# Patient Record
Sex: MALE | Race: WHITE | NOT HISPANIC OR LATINO
[De-identification: names, ages, dates, MRNs, and addresses within clinical notes are randomized per-mention and may not be internally consistent; named-entity substitution may affect disease eponyms.]

---

## 2022-09-06 ENCOUNTER — APPOINTMENT (OUTPATIENT)
Dept: ORTHOPEDIC SURGERY | Facility: CLINIC | Age: 67
End: 2022-09-06

## 2022-09-06 VITALS — HEIGHT: 72 IN | WEIGHT: 165 LBS | BODY MASS INDEX: 22.35 KG/M2

## 2022-09-06 DIAGNOSIS — Z78.9 OTHER SPECIFIED HEALTH STATUS: ICD-10-CM

## 2022-09-06 PROBLEM — Z00.00 ENCOUNTER FOR PREVENTIVE HEALTH EXAMINATION: Status: ACTIVE | Noted: 2022-09-06

## 2022-09-06 PROCEDURE — 99203 OFFICE O/P NEW LOW 30 MIN: CPT

## 2022-09-06 PROCEDURE — 99213 OFFICE O/P EST LOW 20 MIN: CPT

## 2022-09-06 PROCEDURE — 73562 X-RAY EXAM OF KNEE 3: CPT | Mod: RT

## 2022-09-06 RX ORDER — MELOXICAM 15 MG/1
15 TABLET ORAL
Qty: 30 | Refills: 0 | Status: ACTIVE | COMMUNITY
Start: 2022-09-06 | End: 1900-01-01

## 2022-09-06 NOTE — DISCUSSION/SUMMARY
[de-identified] :   At this time I discussed with the patient I am concerned for a tear in his quadriceps tendon.  I placed him in a knee immobilizer.  I discussed with him he can weightbear as tolerated as long as he is wearing the brace.  He should wear the brace at all times.  I would like to get a stat MRI of his knee to evaluate the quad tendon.  I sent a script for meloxicam to the pharmacy to take as needed for pain.  I discussed him he can call after the MRI to go over the results we will also schedule him a follow-up in a week for a surgical consult in case the tendon is torn.  I did discuss with him that if there is a complete rupture of the quad tendon this will require surgery. Patient will call me if any other problems or concerns.  Patient verbalized understanding and agreed with the plan, all questions were answered in the office today.\par

## 2022-09-06 NOTE — IMAGING
[de-identified] :   On examination of his right knee he has some swelling of the knee, no erythema, no ecchymosis he, he is tender to palpation over the distal quad tendon and mildly over the quad muscle.  He is not able to straight leg raise.  He is nontender palpation on the patella.  He is nontender over the tibial plateau.  He is nontender over the medial or lateral joint line.  He can flex extend the knee but has pain with flexion.  He is nontender over the patellar tendon.  The calf is soft and nontender.\par \par X-rays taken in the office today of the right knee show some degenerative changes with joint space narrowing of the medial compartment.  There is a calcific density in the distal quad tendon which could be a calcification or an avulsion fracture.

## 2022-09-06 NOTE — HISTORY OF PRESENT ILLNESS
[de-identified] :  66-year-old male here today for evaluation of his right knee.  Patient states he slipped on a step yesterday and fell injuring his right knee.  He states he is having a lot of pain and difficulty bending the knee.  He is able to put weight on it.  He denies any pain radiating down the leg, he denies any calf pain.  He is here today for further evaluation.

## 2022-09-08 ENCOUNTER — APPOINTMENT (OUTPATIENT)
Dept: MRI IMAGING | Facility: CLINIC | Age: 67
End: 2022-09-08

## 2022-09-08 PROCEDURE — 73721 MRI JNT OF LWR EXTRE W/O DYE: CPT | Mod: RT,MH

## 2022-09-13 ENCOUNTER — APPOINTMENT (OUTPATIENT)
Dept: ORTHOPEDIC SURGERY | Facility: CLINIC | Age: 67
End: 2022-09-13

## 2022-09-13 PROCEDURE — 99204 OFFICE O/P NEW MOD 45 MIN: CPT | Mod: 57

## 2022-09-13 NOTE — HISTORY OF PRESENT ILLNESS
[de-identified] : Patient is here for follow-up evaluation.  He sustained a fall and had pain to the right knee.  He is in a knee immobilizer.  He is unable to straight leg raise\par \par NAD\par Right knee\par No skin breakdown\par Tricompartmental TTP\par Positive patella grind\par neg slr\par palpable gap insertion of quad tendon\par NVI\par Compartments soft and NT\par \par mri right knee\par IMPRESSION:\par 1. Complete tear with 1 cm retraction in the distal quadriceps tendon at its patellar insertion. No patella avulsion fracture is \par seen.\par 2. Partial tear of the deep medial patellofemoral ligament fibers. Moderate suprapatellar effusion.\par 3. Subcutaneous contusion and trace fluid throughout the knee, mostly over the lateral and anterior tissues.\par 4. 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  I reviewed the MRI in detail and explained his tear.  We spoke about operative versus non operative options.  At this point he will proceed with surgery for the right knee.  Risks benefits and alternatives were also explained.  He will proceed with a right quadriceps tendon repair.  I spoke about incisions and rehabilitation.  All questions were answered

## 2022-09-21 ENCOUNTER — RESULT REVIEW (OUTPATIENT)
Age: 67
End: 2022-09-21

## 2022-09-21 ENCOUNTER — OUTPATIENT (OUTPATIENT)
Dept: OUTPATIENT SERVICES | Facility: HOSPITAL | Age: 67
LOS: 1 days | Discharge: HOME | End: 2022-09-21

## 2022-09-21 VITALS
HEIGHT: 72 IN | DIASTOLIC BLOOD PRESSURE: 65 MMHG | RESPIRATION RATE: 15 BRPM | TEMPERATURE: 96 F | SYSTOLIC BLOOD PRESSURE: 127 MMHG | WEIGHT: 166.89 LBS | OXYGEN SATURATION: 98 % | HEART RATE: 60 BPM

## 2022-09-21 DIAGNOSIS — S76.119D: ICD-10-CM

## 2022-09-21 DIAGNOSIS — Z01.818 ENCOUNTER FOR OTHER PREPROCEDURAL EXAMINATION: ICD-10-CM

## 2022-09-21 DIAGNOSIS — S76.119A STRAIN OF UNSPECIFIED QUADRICEPS MUSCLE, FASCIA AND TENDON, INITIAL ENCOUNTER: ICD-10-CM

## 2022-09-21 DIAGNOSIS — Z98.890 OTHER SPECIFIED POSTPROCEDURAL STATES: Chronic | ICD-10-CM

## 2022-09-21 LAB
ALBUMIN SERPL ELPH-MCNC: 5 G/DL — SIGNIFICANT CHANGE UP (ref 3.5–5.2)
ALP SERPL-CCNC: 55 U/L — SIGNIFICANT CHANGE UP (ref 30–115)
ALT FLD-CCNC: 19 U/L — SIGNIFICANT CHANGE UP (ref 0–41)
ANION GAP SERPL CALC-SCNC: 12 MMOL/L — SIGNIFICANT CHANGE UP (ref 7–14)
APTT BLD: 32.6 SEC — SIGNIFICANT CHANGE UP (ref 27–39.2)
AST SERPL-CCNC: 21 U/L — SIGNIFICANT CHANGE UP (ref 0–41)
BASOPHILS # BLD AUTO: 0.03 K/UL — SIGNIFICANT CHANGE UP (ref 0–0.2)
BASOPHILS NFR BLD AUTO: 0.6 % — SIGNIFICANT CHANGE UP (ref 0–1)
BILIRUB SERPL-MCNC: 1.4 MG/DL — HIGH (ref 0.2–1.2)
BUN SERPL-MCNC: 25 MG/DL — HIGH (ref 10–20)
CALCIUM SERPL-MCNC: 9.7 MG/DL — SIGNIFICANT CHANGE UP (ref 8.4–10.5)
CHLORIDE SERPL-SCNC: 103 MMOL/L — SIGNIFICANT CHANGE UP (ref 98–110)
CO2 SERPL-SCNC: 26 MMOL/L — SIGNIFICANT CHANGE UP (ref 17–32)
CREAT SERPL-MCNC: 1 MG/DL — SIGNIFICANT CHANGE UP (ref 0.7–1.5)
EGFR: 82 ML/MIN/1.73M2 — SIGNIFICANT CHANGE UP
EOSINOPHIL # BLD AUTO: 0.15 K/UL — SIGNIFICANT CHANGE UP (ref 0–0.7)
EOSINOPHIL NFR BLD AUTO: 2.9 % — SIGNIFICANT CHANGE UP (ref 0–8)
GLUCOSE SERPL-MCNC: 87 MG/DL — SIGNIFICANT CHANGE UP (ref 70–99)
HCT VFR BLD CALC: 41.1 % — LOW (ref 42–52)
HGB BLD-MCNC: 13.9 G/DL — LOW (ref 14–18)
IMM GRANULOCYTES NFR BLD AUTO: 0.6 % — HIGH (ref 0.1–0.3)
INR BLD: 1.1 RATIO — SIGNIFICANT CHANGE UP (ref 0.65–1.3)
LYMPHOCYTES # BLD AUTO: 1.59 K/UL — SIGNIFICANT CHANGE UP (ref 1.2–3.4)
LYMPHOCYTES # BLD AUTO: 30.3 % — SIGNIFICANT CHANGE UP (ref 20.5–51.1)
MCHC RBC-ENTMCNC: 31.6 PG — HIGH (ref 27–31)
MCHC RBC-ENTMCNC: 33.8 G/DL — SIGNIFICANT CHANGE UP (ref 32–37)
MCV RBC AUTO: 93.4 FL — SIGNIFICANT CHANGE UP (ref 80–94)
MONOCYTES # BLD AUTO: 0.54 K/UL — SIGNIFICANT CHANGE UP (ref 0.1–0.6)
MONOCYTES NFR BLD AUTO: 10.3 % — HIGH (ref 1.7–9.3)
NEUTROPHILS # BLD AUTO: 2.91 K/UL — SIGNIFICANT CHANGE UP (ref 1.4–6.5)
NEUTROPHILS NFR BLD AUTO: 55.3 % — SIGNIFICANT CHANGE UP (ref 42.2–75.2)
NRBC # BLD: 0 /100 WBCS — SIGNIFICANT CHANGE UP (ref 0–0)
PLATELET # BLD AUTO: 169 K/UL — SIGNIFICANT CHANGE UP (ref 130–400)
POTASSIUM SERPL-MCNC: 4.6 MMOL/L — SIGNIFICANT CHANGE UP (ref 3.5–5)
POTASSIUM SERPL-SCNC: 4.6 MMOL/L — SIGNIFICANT CHANGE UP (ref 3.5–5)
PROT SERPL-MCNC: 7 G/DL — SIGNIFICANT CHANGE UP (ref 6–8)
PROTHROM AB SERPL-ACNC: 12.6 SEC — SIGNIFICANT CHANGE UP (ref 9.95–12.87)
RBC # BLD: 4.4 M/UL — LOW (ref 4.7–6.1)
RBC # FLD: 12.4 % — SIGNIFICANT CHANGE UP (ref 11.5–14.5)
SODIUM SERPL-SCNC: 141 MMOL/L — SIGNIFICANT CHANGE UP (ref 135–146)
WBC # BLD: 5.25 K/UL — SIGNIFICANT CHANGE UP (ref 4.8–10.8)
WBC # FLD AUTO: 5.25 K/UL — SIGNIFICANT CHANGE UP (ref 4.8–10.8)

## 2022-09-21 PROCEDURE — 93010 ELECTROCARDIOGRAM REPORT: CPT

## 2022-09-21 PROCEDURE — 71046 X-RAY EXAM CHEST 2 VIEWS: CPT | Mod: 26
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
## 2022-09-21 NOTE — H&P PST ADULT - REASON FOR ADMISSION
Case Type: OP Block Time Suite: [facility] Proceduralist: Shahid Murrieta  Confirmed Surgery DateTime: 10- - 0:00 PAST Date Time: 09- - 9:30 Procedure: RIGHT QUADRICEPS TENDON REPAIR  ERP?: Unavailable Laterality: Right Length of Procedure: 60 Minutes  Anesthesia Type: General

## 2022-09-21 NOTE — H&P PST ADULT - NSANTHOSAYNRD_GEN_A_CORE
No. MARINA screening performed.  STOP BANG Legend: 0-2 = LOW Risk; 3-4 = INTERMEDIATE Risk; 5-8 = HIGH Risk
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
## 2022-09-23 ENCOUNTER — LABORATORY RESULT (OUTPATIENT)
Age: 67
End: 2022-09-23

## 2022-09-23 NOTE — ASU PATIENT PROFILE, ADULT - FALL HARM RISK - RISK INTERVENTIONS

## 2022-09-26 ENCOUNTER — TRANSCRIPTION ENCOUNTER (OUTPATIENT)
Age: 67
End: 2022-09-26

## 2022-09-26 ENCOUNTER — OUTPATIENT (OUTPATIENT)
Dept: OUTPATIENT SERVICES | Facility: HOSPITAL | Age: 67
LOS: 1 days | Discharge: HOME | End: 2022-09-26

## 2022-09-26 ENCOUNTER — APPOINTMENT (OUTPATIENT)
Age: 67
End: 2022-09-26

## 2022-09-26 VITALS
OXYGEN SATURATION: 99 % | HEART RATE: 62 BPM | RESPIRATION RATE: 18 BRPM | DIASTOLIC BLOOD PRESSURE: 68 MMHG | HEIGHT: 72 IN | SYSTOLIC BLOOD PRESSURE: 131 MMHG | WEIGHT: 166.89 LBS | TEMPERATURE: 98 F

## 2022-09-26 VITALS
SYSTOLIC BLOOD PRESSURE: 139 MMHG | TEMPERATURE: 98 F | DIASTOLIC BLOOD PRESSURE: 82 MMHG | HEART RATE: 55 BPM | RESPIRATION RATE: 12 BRPM | OXYGEN SATURATION: 97 %
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
## 2022-09-26 DIAGNOSIS — Z98.890 OTHER SPECIFIED POSTPROCEDURAL STATES: Chronic | ICD-10-CM

## 2022-09-26 DIAGNOSIS — S76.111A STRAIN OF RIGHT QUADRICEPS MUSCLE, FASCIA AND TENDON, INITIAL ENCOUNTER: ICD-10-CM
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
## 2022-09-26 DIAGNOSIS — S76.119A STRAIN OF UNSPECIFIED QUADRICEPS MUSCLE, FASCIA AND TENDON, INITIAL ENCOUNTER: ICD-10-CM

## 2022-09-26 PROCEDURE — 27385 REPAIR OF THIGH MUSCLE: CPT | Mod: RT

## 2022-09-26 RX ORDER — OXYCODONE HYDROCHLORIDE 5 MG/1
5 TABLET ORAL ONCE
Refills: 0 | Status: DISCONTINUED | OUTPATIENT
Start: 2022-09-26 | End: 2022-09-26

## 2022-09-26 RX ORDER — SODIUM CHLORIDE 9 MG/ML
1000 INJECTION, SOLUTION INTRAVENOUS
Refills: 0 | Status: DISCONTINUED | OUTPATIENT
Start: 2022-09-26 | End: 2022-10-10

## 2022-09-26 RX ORDER — ASPIRIN/CALCIUM CARB/MAGNESIUM 324 MG
1 TABLET ORAL
Qty: 14 | Refills: 0
Start: 2022-09-26 | End: 2022-10-09

## 2022-09-26 RX ORDER — ACETAMINOPHEN 500 MG
650 TABLET ORAL ONCE
Refills: 0 | Status: COMPLETED | OUTPATIENT
Start: 2022-09-26 | End: 2022-09-26

## 2022-09-26 RX ORDER — MELOXICAM 15 MG/1
0 TABLET ORAL
Qty: 0 | Refills: 0 | DISCHARGE

## 2022-09-26 RX ORDER — HYDROMORPHONE HYDROCHLORIDE 2 MG/ML
0.5 INJECTION INTRAMUSCULAR; INTRAVENOUS; SUBCUTANEOUS
Refills: 0 | Status: DISCONTINUED | OUTPATIENT
Start: 2022-09-26 | End: 2022-09-26

## 2022-09-26 RX ADMIN — Medication 650 MILLIGRAM(S): at 11:15

## 2022-09-26 RX ADMIN — SODIUM CHLORIDE 100 MILLILITER(S): 9 INJECTION, SOLUTION INTRAVENOUS at 14:09

## 2022-09-26 NOTE — PRE-ANESTHESIA EVALUATION ADULT - NSANTHOSAYNRD_GEN_A_CORE
No. MAIRNA screening performed.  STOP BANG Legend: 0-2 = LOW Risk; 3-4 = INTERMEDIATE Risk; 5-8 = HIGH Risk

## 2022-09-26 NOTE — CHART NOTE - NSCHARTNOTEFT_GEN_A_CORE
PACU ANESTHESIA ADMISSION NOTE      Procedure: Repair of right quadriceps tendon   Post op diagnosis:      ____  Intubated  TV:______       Rate: ______      FiO2: ______    _x___  Patent Airway    _x___  Full return of protective reflexes    __  Full recovery from anesthesia / back to baseline status    Vitals:  T-98.3  HR: 50  BP: 130/62  RR: 18 (09-26-22 @ 10:37) (18 - 18)  SpO2: 99% (09-26-22 @ 10:37) (99% - 99%)    Mental Status:  _x___ Awake   _____ Alert   _____ Drowsy   _____ Sedated    Nausea/Vomiting:  _x___  NO       ______Yes,   See Post - Op Orders         Pain Scale (0-10):  __0___    Treatment: _x___ None    ____ See Post - Op/PCA Orders    Post - Operative Fluids:   __x__ Oral   ____ See Post - Op Orders    Plan: Discharge:   _x___Home       _____Floor     _____Critical Care    _____  Other:_________________    Comments:  No anesthesia issues or complications noted.  Discharge when criteria met.
Yes

## 2022-09-26 NOTE — ASU DISCHARGE PLAN (ADULT/PEDIATRIC) - ASU DC SPECIAL INSTRUCTIONSFT
Post Operative Instructions for Knee Surgery    Your Surgery Included  [ ] Menisectomy  [ ] Meniscus Repair					  [ ] Synovectomy/Plica Excision	  [ ] Debridement/Chondroplasty  [ ] Lysis of Adhesions  [ ] ACL reconstruction			  [ ] PCL Reconstruction  [x ] Other: quadriceps tendon repair  	  Call our office ([phone]) immediately if you experience any of the following:  •	Excessive bleeding or pus like drainage at the incision site  •	Uncontrollable pain not relieved by pain medication  •	Excessive swelling or redness at the incision site  •	Fever above 101.5 degrees not controlled with Tylenol or Motrin  •	Shortness of Breath  •	Any foul odor or blistering from the surgery site    Pain Management: You were given one or more prescriptions before leaving the hospital. Have the prescriptions filled at a pharmacy on your way home and follow the instructions on the bottles.   Regional Anesthesia Injections (Blocks): You may have been given a regional nerve block either before or after surgery. This may numb your leg for 24-36 hours  	*Proceed with caution when weight bearing on your leg    Diet: Eat a bland diet for the first day after surgery. Progress your diet as tolerated. Constipation may occur with Narcotic usage, contact our office if you are experiencing constipation.    Activity: Limit your activity during the first 48 hours, keep your leg elevated with pillows under your heel. After the first 48 hours at home, increase your activity level based on your symptoms.    Dressing Change: Keep clean and dry until follow up.    Weight Bearing:						  [ x ] Weight bearing as tolerated		  [ ] Nonweight bearing				  [ ] Other:						    Operative Knee Range of Motion  [ ] Full range of motion  [ ] ROM 0-90 deg  [ x ] Other: knee locked in full extension at all times    Knee Exercises: You may do these exercises for 2-5 mins five times a day in order to help regain your range of motion.  [ ] Quad Sets: Begin activating your quadriceps muscle by driving your knee downward into full knee extension while seated on a table or bed   with a towel rolled and propped under your heel  [ ] Straight Leg Raise: While dionna your quadriceps muscle, lift your fully extended leg to the level of your non-operative knee  [ ] Heel Slides: With the knee straight, slide your heel slowly toward your buttocks, hold at the endpoint for 10-15 seconds, then slowly straighten  [  x ] Ankle Pumps: With your knee straight, move your ankle in a "pumping"  fashion to activate your calf and leg muscle     Follow Up: As Scheduled

## 2022-10-03 ENCOUNTER — APPOINTMENT (OUTPATIENT)
Age: 67
End: 2022-10-03

## 2022-10-04 ENCOUNTER — APPOINTMENT (OUTPATIENT)
Dept: ORTHOPEDIC SURGERY | Facility: CLINIC | Age: 67
End: 2022-10-04

## 2022-10-04 PROCEDURE — 99024 POSTOP FOLLOW-UP VISIT: CPT

## 2022-10-13 ENCOUNTER — APPOINTMENT (OUTPATIENT)
Dept: ORTHOPEDIC SURGERY | Facility: CLINIC | Age: 67
End: 2022-10-13

## 2022-11-21 PROCEDURE — 33523 CABG ART-VEIN SIX OR MORE: CPT | Mod: AS

## 2022-11-21 PROCEDURE — 33533 CABG ARTERIAL SINGLE: CPT | Mod: AS

## 2022-11-21 PROCEDURE — 33508 ENDOSCOPIC VEIN HARVEST: CPT | Mod: AS,59

## 2022-12-08 ENCOUNTER — APPOINTMENT (OUTPATIENT)
Dept: ORTHOPEDIC SURGERY | Facility: CLINIC | Age: 67
End: 2022-12-08

## 2022-12-08 PROCEDURE — 99024 POSTOP FOLLOW-UP VISIT: CPT

## 2022-12-08 NOTE — IMAGING
[de-identified] :   Examination of the right knee is as follows:  No effusion noted.  No where the more ecchymosis.  Mild atrophy of quad muscle noted.  Able to perform active straight leg raise.  Knee flexion from 0-100 degrees with mild pain.  Calf is soft and nontender.  Negative Betty's.  Light touch intact throughout.  Nonantalgic gait.  Ambulation with cane.

## 2022-12-08 NOTE — DISCUSSION/SUMMARY
[de-identified] :   I advised patient to wean off of his surgical knee brace since he is approximately 10 weeks out of surgery.  He may weightbear as tolerated with a cane if needed.\par \par He will continue physical therapy as directed and a new script was provided for him.  He will take OTC Tylenol as needed for pain.  Patient will follow-up in 2-3 months for further evaluation.  All of the patient's questions/concerns were answered in detail.  \par \par Patient was seen under the supervision of Dr. Murrieta.\par

## 2022-12-08 NOTE — HISTORY OF PRESENT ILLNESS
[de-identified] :  Patient is a 67-year-old male who reports to the office for subsequent re-evaluation of his right knee.  He is status post right quadriceps tendon repair on 09/26/2022 done by Dr. Murrieta.  He has been doing physical therapy which has been giving him relief.  Certain range of motion aggravate the patient's pain at times.  He has been trying to wean himself off of wearing the surgical knee brace and using the cane.  Denies any numbness or tingling.

## 2023-01-30 NOTE — ASU PREOP CHECKLIST - NSSDAENDDT_GEN_ALL_CORE
Flexiril for symptomatic relief    Wrist braces at night for carpel tunnel    PT for neck pain    26-Sep-2022 12:13

## 2023-03-09 ENCOUNTER — APPOINTMENT (OUTPATIENT)
Dept: ORTHOPEDIC SURGERY | Facility: CLINIC | Age: 68
End: 2023-03-09
Payer: MEDICARE

## 2023-03-09 DIAGNOSIS — S76.109A UNSPECIFIED INJURY OF UNSPECIFIED QUADRICEPS MUSCLE, FASCIA AND TENDON, INITIAL ENCOUNTER: ICD-10-CM

## 2023-03-09 PROCEDURE — 99213 OFFICE O/P EST LOW 20 MIN: CPT

## 2023-03-09 NOTE — IMAGING
[de-identified] : Right knee exam is as follows: No effusion noted.  No erythema or ecchymosis.  Able to perform active straight leg raise.  Knee flexion from 0 to 140 degrees without pain.  Mild diffuse TTP.  Calf is soft and nontender.  Good Betty's.  Nonantalgic gait.  Light touch intact throughout.

## 2023-03-09 NOTE — HISTORY OF PRESENT ILLNESS
[de-identified] : Patient is a 67-year-old male who reports to the office for subsequent reevaluation of his right knee pain.  He had a quad tendon repair done on 9/26/2022 by Dr. Murrieta.  He went through several weeks of formal physical therapy which has given him relief.  He states that he is about 90 to 95% better.  Denies any buckling, locking, or stability.  Denies any numbness or tingling.

## 2023-03-09 NOTE — DISCUSSION/SUMMARY
[de-identified] : Patient is doing well several months status post right quadriceps tendon repair.  He completed several weeks of formal PT.  The knee conditioning program from the [facility] was given to the patient so they may try that at home.\par \par Will take OTC NSAIDs as needed for pain.  The patient was advised to rest/ice the area.  They may alternate with warm compresses as needed.  The patient will follow-up on an as-needed basis.  All of the patient's questions/concerns were answered in detail.\par \par The patient was seen under the supervision of Dr. Murrieta.

## 2023-07-13 ENCOUNTER — APPOINTMENT (OUTPATIENT)
Dept: ORTHOPEDIC SURGERY | Facility: CLINIC | Age: 68
End: 2023-07-13
Payer: MEDICARE

## 2023-07-13 DIAGNOSIS — M16.11 UNILATERAL PRIMARY OSTEOARTHRITIS, RIGHT HIP: ICD-10-CM

## 2023-07-13 DIAGNOSIS — M17.11 UNILATERAL PRIMARY OSTEOARTHRITIS, RIGHT KNEE: ICD-10-CM

## 2023-07-13 PROCEDURE — 73562 X-RAY EXAM OF KNEE 3: CPT | Mod: RT

## 2023-07-13 PROCEDURE — 73503 X-RAY EXAM HIP UNI 4/> VIEWS: CPT | Mod: RT

## 2023-07-13 PROCEDURE — 99213 OFFICE O/P EST LOW 20 MIN: CPT

## 2023-07-13 NOTE — IMAGING
[de-identified] : Examination of the right knee: Mild swelling appreciated throughout.  No ecchymosis or erythema appreciated.  Skin is intact.  Patient mildly tense palpation along the medial and lateral joint lines.  Positive patellar grind.  Range of motion upon flexion and extension limited secondary to pain and swelling.  Negative Betty's.  Negative Lachman's.  Sensorimotor intact distally.  Neuro vas intact.  Calf is soft and nontender.  Able to bear weight and ambulate experiences mild pain when doing so.

## 2023-07-13 NOTE — DISCUSSION/SUMMARY
[de-identified] : Treatment plan as discussed:\par \par My clinical suspicion is high for flareup of arthritis of the knee given the patient's history, physical examination findings, and x-ray findings.\par \par I recommended pain medication.  He will take Tylenol over-the-counter as needed for pain\par \par \par I recommended patient rest, ice, compress, and elevate the knee regularly. Encouraged activity modification as tolerable. Encouraged gentle range of motion to avoid stiffness. No gym /sports at least until further evaluation.\par \par Script for physical therapy provided for improved ROM and strengthening of surrounding musculature. Benefits discussed. \par \par All questions and concerns addressed to patient's satisfaction. Patient expresses full understanding of treatment plan.\par Patient will follow up in 4-6 weeks with Dr. Murrieta. Will consider cortisone/gel injections in repeat evaluation if symptoms do not improve.\par

## 2023-07-13 NOTE — HISTORY OF PRESENT ILLNESS
[de-identified] : 67-year-old male here for evaluation of right knee pain. Patient reports an aching nontraumatic pain that which worsens upon going from standing to standing position going up and down stairs.  Denies any trauma or falls.  He takes Advil over-the-counter without relief.  Able to bear weight and ambulate however experiences pain with doing so.\par

## 2023-08-22 ENCOUNTER — APPOINTMENT (OUTPATIENT)
Dept: ORTHOPEDIC SURGERY | Facility: CLINIC | Age: 68
End: 2023-08-22

## 2023-09-26 ENCOUNTER — APPOINTMENT (OUTPATIENT)
Dept: ORTHOPEDIC SURGERY | Facility: CLINIC | Age: 68
End: 2023-09-26

## 2023-10-05 ENCOUNTER — APPOINTMENT (OUTPATIENT)
Dept: ORTHOPEDIC SURGERY | Facility: CLINIC | Age: 68
End: 2023-10-05

## 2024-01-11 ENCOUNTER — APPOINTMENT (OUTPATIENT)
Dept: ORTHOPEDIC SURGERY | Facility: CLINIC | Age: 69
End: 2024-01-11
Payer: COMMERCIAL

## 2024-01-11 VITALS — BODY MASS INDEX: 21.67 KG/M2 | HEIGHT: 72 IN | WEIGHT: 160 LBS

## 2024-01-11 DIAGNOSIS — S89.91XA UNSPECIFIED INJURY OF RIGHT LOWER LEG, INITIAL ENCOUNTER: ICD-10-CM

## 2024-01-11 PROCEDURE — 72040 X-RAY EXAM NECK SPINE 2-3 VW: CPT

## 2024-01-11 PROCEDURE — 73562 X-RAY EXAM OF KNEE 3: CPT | Mod: RT

## 2024-01-11 PROCEDURE — 99214 OFFICE O/P EST MOD 30 MIN: CPT | Mod: ACP

## 2024-01-11 RX ORDER — TIZANIDINE 4 MG/1
4 TABLET ORAL
Qty: 20 | Refills: 0 | Status: ACTIVE | COMMUNITY
Start: 2024-01-11 | End: 1900-01-01

## 2024-01-11 RX ORDER — MELOXICAM 15 MG/1
15 TABLET ORAL
Qty: 30 | Refills: 0 | Status: ACTIVE | COMMUNITY
Start: 2024-01-11 | End: 1900-01-01

## 2024-01-11 NOTE — DATA REVIEWED
[FreeTextEntry1] : X-ray images were obtained at the office today.  AP, lateral, oblique views of the right knee reveal no acute fractures, dislocations, bony abnormalities.  Mild to moderate degenerative changes noted, no significant change compared to x-rays taken in July 2023.  AP, lateral views of the C-spine obtained in office today reveal degenerative changes multilevel.  No acute fractures or dislocations noted
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
## 2024-01-11 NOTE — PHYSICAL EXAM
[de-identified] : Physical exam of right knee: -No erythema, edema, ecchymosis present.  Skin intact -no TTP of knee joint -Calf is soft and nontender -Equivocal Betty's, patient able to straight leg raise -Full extension of the knee, flexion to about 80 degrees, pain with flexion -Sensation intact to light touch   Physical exam of the cervical spine: -No erythema, edema, ecchymosis present.  Skin intact -Mild TTP over paraspinal muscles of the C-spine left and right.  No TTP over cervical spine or thoracic spine -No TTP of bilateral upper extremities, full strength of bilateral upper extremities -+2 radial pulse bilaterally, sensation intact to light touch

## 2024-01-11 NOTE — DISCUSSION/SUMMARY
[de-identified] : Patient has an injury of the right knee and C-spine.  At this time, I explained the patient strained the muscles of his cervical spine.  Treatment for both the knee and the C-spine include heat to the area, gentle range of motion and stretching as tolerated.  I am prescribing meloxicam 15 mg to take for the next 7 days in a row for pain and inflammation and then on an as-needed basis.  Advised patient to take medication on a full stomach as it can irritate the stomach and if he has any symptoms of stomach irritation was advised to stop taking it.  Also advised that can increase blood pressure.  Patient was also prescribed tizanidine 4 mg to take at night for muscle relaxation.  Advised that this may help his neck.  He can try taking it for couple days at night and if it does not help he can stop taking it.  Patient was advised that this can make him tired, not to drive while taking this and encouraged to take it right before bed.  Patient will follow-up in approximately 3 weeks with a doctor from our trauma department for further evaluation.  Patient agrees to above plan and all questions were answered today

## 2024-01-31 ENCOUNTER — APPOINTMENT (OUTPATIENT)
Dept: ORTHOPEDIC SURGERY | Facility: CLINIC | Age: 69
End: 2024-01-31
Payer: COMMERCIAL

## 2024-01-31 DIAGNOSIS — S16.1XXA STRAIN OF MUSCLE, FASCIA AND TENDON AT NECK LEVEL, INITIAL ENCOUNTER: ICD-10-CM

## 2024-01-31 DIAGNOSIS — S89.91XD UNSPECIFIED INJURY OF RIGHT LOWER LEG, SUBSEQUENT ENCOUNTER: ICD-10-CM

## 2024-01-31 PROCEDURE — 99212 OFFICE O/P EST SF 10 MIN: CPT

## 2024-01-31 PROCEDURE — 73562 X-RAY EXAM OF KNEE 3: CPT | Mod: RT

## 2024-01-31 PROCEDURE — 72050 X-RAY EXAM NECK SPINE 4/5VWS: CPT
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
## 2024-01-31 NOTE — PHYSICAL EXAM
[de-identified] : The patient is well-appearing.  His height is 6 feet and his weight is 160 pounds.  He has a normal gait with no limp with no assistive devices.  Examination of the right knee demonstrates intact skin with no effusion and no swelling.  No tenderness around the knee.  He is able to straight leg raise with no extensor lag.  He has excellent quad strength.  He has full supple knee range of motion with no pain.  Examination of the cervical spine demonstrates no tenderness over the midline or paraspinals.  He has good cervical range of motion with no pain.  He has 5 out of 5 strength across the bilateral upper extremities with no obvious deficits.  Sensation is intact throughout all dermatomes.

## 2024-01-31 NOTE — ASSESSMENT
[FreeTextEntry1] : Assessment: 1.  Cervical strain-resolved 2.  Right knee pain-resolved  Plan: 1.  Clinical and radiographic findings were reviewed with the patient 2.  His neck pain and right knee pain have resolved and he is now back to baseline. 3.  I will be happy to see him back in the office on an as-needed basis. 4.  Plan of care was reviewed with the patient and he is amenable.  All questions were answered.
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
## 2024-01-31 NOTE — DATA REVIEWED
[TextEntry] : Right knee x-rays taken today were personally reviewed, demonstrating no acute fracture.  Mild degenerative changes noted with maintained joint spaces.  Cervical spine x-rays taken today including AP, lateral, and flexion-extension views were personally reviewed, demonstrating multilevel degenerative changes.  Loss of normal cervical lordosis.  No acute fracture.

## 2024-05-31 ENCOUNTER — APPOINTMENT (OUTPATIENT)
Dept: PULMONOLOGY | Facility: CLINIC | Age: 69
End: 2024-05-31

## 2024-12-04 NOTE — DATA REVIEWED
[FreeTextEntry1] : X-rays of the right knee taken in the office today: Moderate osteoarthritic changes appreciated throughout.  No acute fractures, subluxations, or dislocations.\par \par \par X-rays of the right hip taken in the office today: Osteoarthritic changes appreciated throughout.  No acute fractures, subluxations, or dislocations.\par  n/a